# Patient Record
Sex: FEMALE | Race: WHITE | NOT HISPANIC OR LATINO | URBAN - METROPOLITAN AREA
[De-identification: names, ages, dates, MRNs, and addresses within clinical notes are randomized per-mention and may not be internally consistent; named-entity substitution may affect disease eponyms.]

---

## 2017-03-10 ENCOUNTER — OFFICE VISIT - HEALTHEAST (OUTPATIENT)
Dept: FAMILY MEDICINE | Facility: CLINIC | Age: 27
End: 2017-03-10

## 2017-03-10 DIAGNOSIS — Z01.419 WELL WOMAN EXAM: ICD-10-CM

## 2017-03-10 DIAGNOSIS — Z00.00 HEALTHCARE MAINTENANCE: ICD-10-CM

## 2017-03-10 DIAGNOSIS — M25.532 LEFT WRIST PAIN: ICD-10-CM

## 2017-03-10 DIAGNOSIS — L70.9 ACNE: ICD-10-CM

## 2017-03-10 DIAGNOSIS — N89.8 VAGINAL DISCHARGE: ICD-10-CM

## 2017-03-10 ASSESSMENT — MIFFLIN-ST. JEOR: SCORE: 1542.46

## 2017-03-17 LAB
BKR LAB AP ABNORMAL BLEEDING: NO
BKR LAB AP BIRTH CONTROL/HORMONES: ABNORMAL
BKR LAB AP CERVICAL APPEARANCE: ABNORMAL
BKR LAB AP GYN ADEQUACY: ABNORMAL
BKR LAB AP GYN INTERPRETATION: ABNORMAL
BKR LAB AP GYN OTHER FINDINGS: ABNORMAL
BKR LAB AP HPV REFLEX: ABNORMAL
BKR LAB AP LMP: ABNORMAL
BKR LAB AP PATIENT STATUS: ABNORMAL
BKR LAB AP PREVIOUS ABNORMAL: ABNORMAL
BKR LAB AP PREVIOUS NORMAL: ABNORMAL
HIGH RISK?: NO
PATH REPORT.COMMENTS IMP SPEC: ABNORMAL
RESULT FLAG (HE HISTORICAL CONVERSION): ABNORMAL

## 2017-03-30 ENCOUNTER — COMMUNICATION - HEALTHEAST (OUTPATIENT)
Dept: FAMILY MEDICINE | Facility: CLINIC | Age: 27
End: 2017-03-30

## 2017-05-01 ENCOUNTER — AMBULATORY - HEALTHEAST (OUTPATIENT)
Dept: FAMILY MEDICINE | Facility: CLINIC | Age: 27
End: 2017-05-01

## 2017-05-01 DIAGNOSIS — R87.613 HSIL ON PAP SMEAR OF CERVIX: ICD-10-CM

## 2017-05-01 DIAGNOSIS — B37.9 CANDIDA INFECTION: ICD-10-CM

## 2017-05-01 DIAGNOSIS — L70.9 ACNE: ICD-10-CM

## 2017-05-04 ENCOUNTER — COMMUNICATION - HEALTHEAST (OUTPATIENT)
Dept: FAMILY MEDICINE | Facility: CLINIC | Age: 27
End: 2017-05-04

## 2017-05-05 LAB
LAB AP CHARGES (HE HISTORICAL CONVERSION): NORMAL
PATH REPORT.COMMENTS IMP SPEC: NORMAL
PATH REPORT.COMMENTS IMP SPEC: NORMAL
PATH REPORT.FINAL DX SPEC: NORMAL
PATH REPORT.GROSS SPEC: NORMAL
PATH REPORT.MICROSCOPIC SPEC OTHER STN: NORMAL
PATH REPORT.RELEVANT HX SPEC: NORMAL
RESULT FLAG (HE HISTORICAL CONVERSION): NORMAL

## 2017-05-16 ENCOUNTER — OFFICE VISIT - HEALTHEAST (OUTPATIENT)
Dept: FAMILY MEDICINE | Facility: CLINIC | Age: 27
End: 2017-05-16

## 2017-05-16 DIAGNOSIS — Z30.432 ENCOUNTER FOR IUD REMOVAL: ICD-10-CM

## 2017-05-16 ASSESSMENT — MIFFLIN-ST. JEOR: SCORE: 1524.77

## 2017-05-17 ENCOUNTER — AMBULATORY - HEALTHEAST (OUTPATIENT)
Dept: FAMILY MEDICINE | Facility: CLINIC | Age: 27
End: 2017-05-17

## 2017-05-17 DIAGNOSIS — N87.1 CIN II (CERVICAL INTRAEPITHELIAL NEOPLASIA II): ICD-10-CM

## 2017-06-26 ENCOUNTER — COMMUNICATION - HEALTHEAST (OUTPATIENT)
Dept: FAMILY MEDICINE | Facility: CLINIC | Age: 27
End: 2017-06-26

## 2017-07-07 ENCOUNTER — COMMUNICATION - HEALTHEAST (OUTPATIENT)
Dept: ADMINISTRATIVE | Facility: CLINIC | Age: 27
End: 2017-07-07

## 2017-08-14 ENCOUNTER — COMMUNICATION - HEALTHEAST (OUTPATIENT)
Dept: OBGYN | Facility: CLINIC | Age: 27
End: 2017-08-14

## 2017-08-17 ENCOUNTER — OFFICE VISIT - HEALTHEAST (OUTPATIENT)
Dept: FAMILY MEDICINE | Facility: CLINIC | Age: 27
End: 2017-08-17

## 2017-08-17 DIAGNOSIS — M67.431 GANGLION CYST OF WRIST, RIGHT: ICD-10-CM

## 2017-08-17 DIAGNOSIS — Z71.84 TRAVEL ADVICE ENCOUNTER: ICD-10-CM

## 2017-08-17 DIAGNOSIS — R87.613 HSIL ON PAP SMEAR OF CERVIX: ICD-10-CM

## 2017-08-21 ENCOUNTER — COMMUNICATION - HEALTHEAST (OUTPATIENT)
Dept: FAMILY MEDICINE | Facility: CLINIC | Age: 27
End: 2017-08-21

## 2017-08-21 DIAGNOSIS — M67.40 GANGLION CYST: ICD-10-CM

## 2017-09-01 ENCOUNTER — RECORDS - HEALTHEAST (OUTPATIENT)
Dept: ADMINISTRATIVE | Facility: OTHER | Age: 27
End: 2017-09-01

## 2017-09-01 ENCOUNTER — RECORDS - HEALTHEAST (OUTPATIENT)
Dept: VASCULAR ULTRASOUND | Facility: CLINIC | Age: 27
End: 2017-09-01

## 2017-09-01 DIAGNOSIS — M67.40 GANGLION, UNSPECIFIED SITE: ICD-10-CM

## 2017-09-03 ENCOUNTER — AMBULATORY - HEALTHEAST (OUTPATIENT)
Dept: FAMILY MEDICINE | Facility: CLINIC | Age: 27
End: 2017-09-03

## 2017-09-03 DIAGNOSIS — M25.539 WRIST PAIN: ICD-10-CM

## 2017-09-03 DIAGNOSIS — M67.439 GANGLION CYST OF WRIST: ICD-10-CM

## 2017-09-13 ENCOUNTER — COMMUNICATION - HEALTHEAST (OUTPATIENT)
Dept: FAMILY MEDICINE | Facility: CLINIC | Age: 27
End: 2017-09-13

## 2017-09-13 DIAGNOSIS — N92.0 MENORRHAGIA: ICD-10-CM

## 2017-10-26 ENCOUNTER — COMMUNICATION - HEALTHEAST (OUTPATIENT)
Dept: ADMINISTRATIVE | Facility: CLINIC | Age: 27
End: 2017-10-26

## 2017-10-30 ENCOUNTER — RECORDS - HEALTHEAST (OUTPATIENT)
Dept: ADMINISTRATIVE | Facility: OTHER | Age: 27
End: 2017-10-30

## 2017-11-01 ENCOUNTER — AMBULATORY - HEALTHEAST (OUTPATIENT)
Dept: OBGYN | Facility: CLINIC | Age: 27
End: 2017-11-01

## 2017-11-01 ENCOUNTER — COMMUNICATION - HEALTHEAST (OUTPATIENT)
Dept: LAB | Facility: CLINIC | Age: 27
End: 2017-11-01

## 2017-11-01 DIAGNOSIS — Z01.812 PRE-PROCEDURAL LABORATORY EXAMINATION: ICD-10-CM

## 2017-11-06 ENCOUNTER — AMBULATORY - HEALTHEAST (OUTPATIENT)
Dept: OBGYN | Facility: CLINIC | Age: 27
End: 2017-11-06

## 2017-11-06 ENCOUNTER — AMBULATORY - HEALTHEAST (OUTPATIENT)
Dept: LAB | Facility: CLINIC | Age: 27
End: 2017-11-06

## 2017-11-06 DIAGNOSIS — Z01.812 PRE-PROCEDURAL LABORATORY EXAMINATION: ICD-10-CM

## 2017-11-06 DIAGNOSIS — N87.1 MODERATE CERVICAL DYSPLASIA, HISTOLOGICALLY CONFIRMED: ICD-10-CM

## 2017-11-06 RX ORDER — ISOTRETINOIN 20 MG/1
20 CAPSULE, LIQUID FILLED ORAL 3 TIMES DAILY
Status: SHIPPED | COMMUNITY
Start: 2017-11-02

## 2017-11-06 ASSESSMENT — MIFFLIN-ST. JEOR: SCORE: 1533.85

## 2017-11-08 ENCOUNTER — HOSPITAL ENCOUNTER (OUTPATIENT)
Dept: ULTRASOUND IMAGING | Facility: HOSPITAL | Age: 27
Discharge: HOME | End: 2017-11-08
Attending: FAMILY MEDICINE

## 2017-11-08 DIAGNOSIS — N92.0 MENORRHAGIA: ICD-10-CM

## 2017-11-08 LAB
BKR LAB AP ABNORMAL BLEEDING: YES
BKR LAB AP BIRTH CONTROL/HORMONES: ABNORMAL
BKR LAB AP CERVICAL APPEARANCE: ABNORMAL
BKR LAB AP GYN ADEQUACY: ABNORMAL
BKR LAB AP GYN INTERPRETATION: ABNORMAL
BKR LAB AP GYN OTHER FINDINGS: ABNORMAL
BKR LAB AP HPV REFLEX: ABNORMAL
BKR LAB AP LMP: ABNORMAL
BKR LAB AP PATIENT STATUS: ABNORMAL
BKR LAB AP PREVIOUS ABNORMAL: ABNORMAL
BKR LAB AP PREVIOUS NORMAL: 2013
HIGH RISK?: YES
LAB AP CHARGES (HE HISTORICAL CONVERSION): NORMAL
PATH REPORT.COMMENTS IMP SPEC: ABNORMAL
PATH REPORT.COMMENTS IMP SPEC: NORMAL
PATH REPORT.COMMENTS IMP SPEC: NORMAL
PATH REPORT.FINAL DX SPEC: NORMAL
PATH REPORT.GROSS SPEC: NORMAL
PATH REPORT.MICROSCOPIC SPEC OTHER STN: NORMAL
PATH REPORT.RELEVANT HX SPEC: NORMAL
RESULT FLAG (HE HISTORICAL CONVERSION): ABNORMAL
RESULT FLAG (HE HISTORICAL CONVERSION): NORMAL

## 2017-11-10 ENCOUNTER — OFFICE VISIT - HEALTHEAST (OUTPATIENT)
Dept: FAMILY MEDICINE | Facility: CLINIC | Age: 27
End: 2017-11-10

## 2017-11-10 DIAGNOSIS — Z97.5 IUD (INTRAUTERINE DEVICE) IN PLACE: ICD-10-CM

## 2017-11-10 DIAGNOSIS — N87.1 MODERATE CERVICAL DYSPLASIA, HISTOLOGICALLY CONFIRMED: ICD-10-CM

## 2017-11-10 DIAGNOSIS — N92.0 MENORRHAGIA: ICD-10-CM

## 2017-11-10 DIAGNOSIS — R87.613 HSIL ON PAP SMEAR OF CERVIX: ICD-10-CM

## 2017-11-10 DIAGNOSIS — L70.9 ACNE: ICD-10-CM

## 2017-11-10 DIAGNOSIS — Z30.430 ENCOUNTER FOR IUD INSERTION: ICD-10-CM

## 2017-11-10 LAB
HPV INTERPRETATION - HISTORICAL: NORMAL
HPV INTERPRETER - HISTORICAL: NORMAL

## 2017-11-15 ENCOUNTER — COMMUNICATION - HEALTHEAST (OUTPATIENT)
Dept: OBGYN | Facility: CLINIC | Age: 27
End: 2017-11-15

## 2017-11-15 ENCOUNTER — COMMUNICATION - HEALTHEAST (OUTPATIENT)
Dept: FAMILY MEDICINE | Facility: CLINIC | Age: 27
End: 2017-11-15

## 2017-11-28 ENCOUNTER — OFFICE VISIT - HEALTHEAST (OUTPATIENT)
Dept: OBGYN | Facility: CLINIC | Age: 27
End: 2017-11-28

## 2017-11-28 DIAGNOSIS — N87.0 CIN I (CERVICAL INTRAEPITHELIAL NEOPLASIA I): ICD-10-CM

## 2017-11-28 DIAGNOSIS — Z87.410 HISTORY OF CERVICAL DYSPLASIA: ICD-10-CM

## 2017-11-28 ASSESSMENT — MIFFLIN-ST. JEOR: SCORE: 1529.31

## 2017-11-30 ENCOUNTER — RECORDS - HEALTHEAST (OUTPATIENT)
Dept: ADMINISTRATIVE | Facility: OTHER | Age: 27
End: 2017-11-30

## 2021-05-30 VITALS — HEIGHT: 69 IN | BODY MASS INDEX: 24.14 KG/M2 | WEIGHT: 163 LBS

## 2021-05-30 VITALS — BODY MASS INDEX: 24.72 KG/M2 | WEIGHT: 166.9 LBS | HEIGHT: 69 IN

## 2021-05-30 VITALS — BODY MASS INDEX: 24.54 KG/M2 | WEIGHT: 165 LBS

## 2021-05-31 VITALS — BODY MASS INDEX: 24.44 KG/M2 | HEIGHT: 69 IN | WEIGHT: 165 LBS

## 2021-05-31 VITALS — BODY MASS INDEX: 24.84 KG/M2 | WEIGHT: 167 LBS

## 2021-05-31 VITALS — BODY MASS INDEX: 24.25 KG/M2 | WEIGHT: 163 LBS

## 2021-05-31 VITALS — WEIGHT: 164 LBS | HEIGHT: 69 IN | BODY MASS INDEX: 24.29 KG/M2

## 2021-06-09 NOTE — PROGRESS NOTES
FEMALE ADULT PREVENTIVE EXAM    CHIEF COMPLAINT:  Female preventive exam.    SUBJECTIVE:  Mariajose Villanueva is a 26 y.o. female.  She last saw me Visit date not found.  Patient here for annual physical.  She has no major concerns or changes to her health history except she is taking medication for acne and is seeing a dermatologist.  She does want a refill on her Differin gel.  She is on oral medication as well and is unsure of the name but sounds to be Accutane.  She is taking it for a couple months already and give herself a break and recently came back on it.  She has not had any blood draws for this medication.  She does have a question about nevi that is hallowed beneath her left breast as well as one on her back.  She has not shown these yet to the dermatologist.  She also notes that she has been having some mild left wrist pain that has been somewhat painful.  It is not present all the time.  No associated tingling or weakness.  She does do yoga and cycling 2 and 3 days a week and finds that it exacerbates symptoms slightly.  Does not cause any cramping in all major concern but was curious if there was anything she should be concerned about.    Has also been having some mild discomfort of her left inner ear and has had a history of cerumen impaction and would like that looked at.  Also complains of some mild vaginal discharge.  She has had STD testing in the past and no previous history.  She is interested in having that tested again.  She also notes that she has had some bloating in the lower abdomen and some weight gain.  She does exercise regularly and is still within a normal BMI however is unsure if her eating has changed slightly.  She just recently got back into exercising regularly.  No family history of ovarian cancer or other cancers to her knowledge.  She denies any constipation.  No sharp pain and occasionally will have some discomfort in the pelvic region.  She  has no past medical history on  file.    Lab Results   Component Value Date     03/10/2017    K 3.9 03/10/2017    BUN 12 03/10/2017     No results found for: CHOL, HDL, LDLCALC, TRIG  No results found for: TSH  BP Readings from Last 3 Encounters:   03/10/17 108/72   06/06/16 98/58   04/21/16 100/64       Surgeries:    Past Surgical History:   Procedure Laterality Date     NO PAST SURGERIES         Family History:  Her family history includes Coronary artery disease in her paternal uncle.  Family history of stroke.  Social History:  She  reports that she has never smoked. She does not have any smokeless tobacco history on file. She reports that she drinks alcohol.    Medications:    Current Outpatient Prescriptions:      adapalene (DIFFERIN) 0.1 % gel, Apply topically bedtime., Disp: 45 g, Rfl: 3     clindamycin (CLINDAGEL) 1 % gel, Apply topically 2 (two) times a day., Disp: , Rfl:   HELD MEDICATIONS: None.    Allergies:  No latex allergies.  No Known Allergies    RISK BEHAVIOR & HEALTH HABITS  Patient exercises 4 days a week and eats 3 healthy meals a day.  Drinks maybe 1 alcoholic beverage per week.  She works as a research assistant and a single but does have sexual partner that is new.    Guns: NO  Sun Screen: YES  Dental Care: YES    REVIEW OF SYSTEMS:  Complete head to toe review of systems is otherwise negative except as above.    OBJECTIVE:  VITAL SIGNS:    Vitals:    03/10/17 1257   BP: 108/72   Pulse: 60   Resp: 16   Temp: 98  F (36.7  C)     GENERAL:  Patient alert, in no acute distress.  EYES: PERRLA. Extraoccular movements intact, pupils equal, reactive to light and accommodation.  Normal conjunctiva and lids.  Undilated fudoscopic exam normal, including normal size, appearance cup-to-disc ratio.  Normal posterior segments, including no exudates or hemorrhages.  ENT:  Hearing grossly normal.  Normal appearance to ears and nose.  Bilateral TM s, external canals, oropharynx normal. Normal lips, gums and teeth.  Normal nasal  mucosa, septum and turbinates.  NECK:  Supple, without thyromegaly or mass.  RESP:  Clear to auscultation without crackles, wheezes or distress.  Normal respiratory effort.   CV:  Regular rate and rhythm without murmurs, rubs or gallops.  Normal carotid, abdominal aorta, femoral and pedal pulses.  No varicosities or edema.  ABDOMEN:  Soft, non-tender, without hepatosplenomegaly, masses, or hernias.   BREASTS:  Nontender, without masses, nipple discharge, erythema, or axillary adenopathy.    :  Normal pelvic exam, including external genitalia with normal appearance and no lesions.  Normal vaginal exam, including no discharge and normal pelvic support, and no lesions.   Normal ureters, with no masses, tenerness or scarring.  Normal bladder, with no fullness, masses or tenderness.  Cervix well visualized, with normal appearance and no lesions .  Normal uterus, including normal size, shape, mobility with no tenderness.  Normal adnexa, including no nodules, masses or tenderness.  Minimal vaginal discharge  LYMPHATIC: Normal palpation of neck, groin and axilla..  No lymphadenopathy.  No bruising.  NEURO:  CN II-XII intact, motor & sensory function all intact.  DTR and reflexes normal.  PSYCHIATRIC:  Alert & oriented with normal mood and affect.  Good judgment and insight.  SKIN:  Normal inspection and palpation.  Cystic acne prominent more so on lower chin.  She does have hallowed nevi beneath left lower breast-the patient is new..  Also lesion on her right ankle that she has been keeping an eye on and one on her back neither of which look concerning.  MUSCULOSKELETAL: Normal gait and station.  - Spine / Ribs / Pelvis: Normal inspection, ROM, stability and strength: Spine, Head, Neck, Upper and Lower Extremities.    ASSESSMENT & PLAN  Mariajose was seen today for annual exam.    Diagnoses and all orders for this visit:    Well woman exam-we did discuss her abdominal bloating quite a bit and according the patient does not  seem to be consistent with constipation.  We are going to monitor her weight gain now that she is back exercising.  No palpable mass on exam and we did discuss very vague symptoms of ovarian cancer.  I do not think that she has high risk.  We did discuss that if she was ever interested we could consider pelvic ultrasound.  -     Chlamydia trachomatis & Neisseria gonorrhoeae, Amplified Detection  -White prep also ordered for vaginal discharge.  -IUD in place.  -Follow-up annually.  -Patient declines any other STD testing for vaginal discharge.  -     Gynecologic Cytology (PAP Smear)  -     adapalene (DIFFERIN) 0.1 % gel; Apply topically bedtime.    Acne-supposedly on Accutane as it appears on history.  Recommended having liver assessment given potential toxicity with this medication.  She should also inquire with her dermatologist when she sees them next about the nevi on her left breast so we can avoid biopsy if possible  -     Comprehensive Metabolic Panel    Left wrist pain no tendinopathy or weakness on exam.  Patient not overly concerned and does not feel any need to evaluate further at this time.  She is going to try to modify behaviors first and will follow-up if needed    Vaginal discharge- see above      Other orders  -     Cancel: Influenza, Seasonal Quad, Preservative Free 36+ Months  -     Cancel: Lipid Profile      General  Immunizations reviewed and updated .  Alcohol use, safety and moderation discussed.  Recommended adequate calcium intake/osteoporosis prevention.  Discussed colon cancer screening at age 50, 45 if -American.  Diet and exercise reviewed, including goal of aerobic exercise 30-90 minutes most days of the week, moderation of portions sizes, avoiding eating out and fast food and increase in fruits and vegetables.  Discussed safe sex practices.  Discussed & recommended seat belt (& motorcycle helmet) use.  Discussed & recommended smoke detector.  Discussed sun protection.  Discussed  weight management.

## 2021-06-10 NOTE — PROGRESS NOTES
Colposcopy Procedure Note    Mariajose Villanueva is a 26 y.o. female is here today for a Colposcopy. She took ibuprofen prior to her procedure. She has never had a colposcopy or abnormal Pap smear prior to her last Pap smear on . She has never been a smoker. She denies any known family history of cervical cancer. She had three doses of the HPV quadrivalent vaccine. She had a Pau IUD placed in 2015; she does not have any menstrual periods with her IUD in place. She was 20 when she first became sexually active. She is currently sexually active; she denies any known history of STD's.     Indications: Pap smear on 3/10/2017 showed: ASC cannot exclude high grade lesion (ASCH) and HPV was not tested. The prior pap showed ASCUS with NEGATIVE high risk HPV on 4/21/2016. Prior cervical/vaginal disease: normal exam without visible pathology. Prior cervical treatment: no treatment.    Procedure Details   The reason for procedure is explained and informed consent obtained.    Speculum placed in vagina and visualization of cervix achieved, cervix swabbed with 3% acetic acid solution. Cervix is also swabbed with Lugol's solution. Cervical biopsy taken at 6 o'clock. Endocervical curettage performed. Cervical biopsy taken at 12 o'clock. Hemostasis achieved with Monsel's solution.    Findings:  Cervix: acetowhite lesion(s) noted at 12 o'clock and abnormal vessels noted at 6 o'clock; colposcopy encompassing CIN1-II.       Specimens: See orders.  Biopsy at 6 o'clock, large vessel, placed in formalin at 2:50 pm   ECC biopsy placed in formalin at 2:52 pm  Biopsy at 12 o'clock, inflammatory area, placed in formalin at 2:54 pm    Complications: none.    Plan:  Specimens labelled and sent to Pathology. Role of HPV in causing cervical pap smear abnormalities, dysplasia, and cancer is reviewed with the patient. She will be contacted in a few days with biopsy results and recommendations.  I am unsure why HPV did not reflux in this patient.  I will  talk to the lab about that.  Otherwise if biopsies come back unremarkable she should follow-up in 12 months and 24 months for repeat Pap smear code testing versus referring for LEEP.  We also discussed given the fungal organisms and her IUD that inflammation could potentially cause abnormal cell changes on Pap smear.  Will treat for the fungal infection with fluconazole.  She is not having symptoms.  Previous HPV testing was negative.    -Blood pressure slightly low after recently starting spironolactone with her dermatologist.  She will be following up there with blood work.  She does not have any dizzy symptoms.    The procedure lasted a total of 30 minutes face to face with the patient.     I, Tali Priest, am scribing for and in the presence of Dr. Mckeon.  I, Dr. Mckeon, personally performed the services described in this documentation as scribed by Tali Priest in my presence, and it is both accurate and complete.

## 2021-06-10 NOTE — PROGRESS NOTES
"SUBJECTIVE:   Chief Complaint   Patient presents with     IUD removal     placed 05/08/2015 at [facility] - will have removed due to it causing acne     Mariajose Villanueva 26 y.o. female    Current Outpatient Prescriptions   Medication Sig Dispense Refill     adapalene (DIFFERIN) 0.1 % gel Apply topically bedtime. 45 g 3     clindamycin (CLINDAGEL) 1 % gel Apply topically 2 (two) times a day.       norgestimate-ethinyl estradiol (ORTHO TRI-CYCLEN, 28,) 0.18/0.215/0.25 mg-35 mcg (28) Tab tablet Take 1 tablet by mouth daily. 84 tablet 4     spironolactone (ALDACTONE) 50 MG tablet Take 50 mg by mouth 2 (two) times a day at 9am and 6pm.       No current facility-administered medications for this visit.      Allergies: Review of patient's allergies indicates no known allergies.   No LMP recorded. Patient is not currently having periods (Reason: Contraception).    HPI:   Pleasant 26-year-old here for IUD removal.  Had Pau IUD placed about 2 years ago.  Wants it removed because of cystic acne.  Changing to oral contraceptives.  She has already discussed this plan with her PCP Dr. Mckeon, and she has a prescription for oral contraceptives to start after IUD removal.    ROS: as per HPI    OBJECTIVE:   BP 96/66  Pulse (!) 48  Ht 5' 8.75\" (1.746 m)  Wt 163 lb (73.9 kg)  Breastfeeding? No  BMI 24.25 kg/m2    General: Pleasant, well-appearing, in no acute distress.  Pelvis: Normal external genitalia.  Speculum exam reveals normal vaginal mucosa, normal cervix.  IUD strings seen exiting the cervical.  Strings were grasped with a sterile ringed forceps and IUD was easily removed with gentle traction.  No bleeding.  Patient tolerated it well.      ASSESSMENT/PLAN  1.  IUD removal  Pau IUD removed easily without complication today.  She will start oral contraceptive and use backup birth control as previously directed by her PCP.  "

## 2021-06-12 NOTE — PROGRESS NOTES
ASSESSMENT & PLAN:  Mariajose was seen today for travel consult.    Diagnoses and all orders for this visit:    Travel advice encounter    Ganglion cyst of wrist, right    HSIL on Pap smear of cervix    Other orders  -     azithromycin (ZITHROMAX) 500 MG tablet; Take 2 tablets by mouth once for travelers diarrhea  -     typhoid (VIVOTIF) SR capsule; Take 1 capsule every other day for 4 doses  -     doxycycline (VIBRA-TABS) 100 MG tablet; Take 1 tab by mouth daily starting 2 days before travel and continue for 4 weeks after return  -     Hepatitis A vaccine adult IM      Patient will be traveling to [facility] and reviewed with her CDC recommendations for immunizations.  She has opted to do oral typhoid and we discussed that it needs to be refrigerated and also completed before she starts her doxycycline for appropriate effectiveness.  She is also due for her hepatitis a immunization.  We discussed mosquito nuts and eat to prevent mosquito vector illnesses.  If any fever within 6 months of return she should present to clinic for further evaluation.  We discussed malaria prophylaxis and she is opted to doing the doxycycline although she does have very fair skin she will need to use appropriate sunscreen.  We opted for this medication due to her history of acne and also to prevent GI illness it may be the best solution.  I did give her a prescription for azithromycin in the event that she needs treatment for cholera or traveler's diarrhea.  I did not recommend the cholera immunization and she did not need yellow fever.  -Her birth control was renewed and we discussed again when she is going to need to follow-up for another Pap smear.  -Regarding the cyst in her hand it is fairly deep and I do not feel that it is something that I would likely be able to aspirate.  We discussed that we could consider x-ray imaging or ultrasound to evaluate it further but that I may just recommend that she see the hand ortho for further  evaluation.  She will let me know what she would like to do for follow-up    There are no Patient Instructions on file for this visit.     Orders Placed This Encounter   Procedures     Hepatitis A vaccine adult IM     Medications Discontinued During This Encounter   Medication Reason     spironolactone (ALDACTONE) 50 MG tablet Therapy completed       No Follow-up on file.     CHIEF COMPLAINT:  Chief Complaint   Patient presents with     Travel Consult     Going to [facility] for 17 days.  Leaves on 9-2-17        HISTORY OF PRESENT ILLNESS:  Mariajose is a 26 y.o. female presenting to the clinic today for a travel consultation and for evaluation of her right hand pain.     Travel: She is leaving for [facility] on September 2, 2017 and she is staying there for 17 days. She is going to visit her friend and do a safari; her friend lives in [facility] with her boyfriend. She has been to [place] and [place] in the past; she was in [place] in March 2017 and she did not have any typhoid vaccinations at that time. She did not become sick in [facility]. She would like to do the oral typhoid vaccine. She will not be doing any mono work, though her friend works in that field. She does not plan to do any white water rafting or climb [facility]. She would like to take doxycycline for malaria prevention. She is wondering if there are any other interactions she should watch for with these new prescriptions.     Right Hand Pain: She was doing yoga 2-3 weeks ago and the middle of her right palm felt like it popped. She has not been able to put pressure on her hand because it feels weird; putting pressure on the arm of chairs to stand up is painful. She has been resting for the past two weeks. She is always on her computer which aggravates her right palm. Her pain has been better since she has been resting. She is wondering if she could take ibuprofen.     REVIEW OF SYSTEMS:   Contraception: She had her Pau removed about 3 months ago. She has  been tolerating Trinessa 0.18/0.215/0.25 mg-35 mcg daily for birth control. She is not planning to become pregnant anytime soon.     Acne: She is aware she should be careful about sun exposure while using clindamycin and adapalene. She might start Accutane when she returns from [facility]. She might have been on doxycycline in the past for acne.     All other systems are negative.     PFSH:    TOBACCO USE:  History   Smoking Status     Never Smoker   Smokeless Tobacco     Not on file        VITALS:  Vitals:    08/17/17 1433   BP: 108/66   Pulse: 60   Resp: 16   Temp: 98.1  F (36.7  C)   TempSrc: Oral   Weight: 167 lb (75.8 kg)     Wt Readings from Last 3 Encounters:   08/17/17 167 lb (75.8 kg)   05/16/17 163 lb (73.9 kg)   05/01/17 165 lb (74.8 kg)     Body mass index is 24.84 kg/(m^2).  Patient's last menstrual period was 07/17/2017.     PHYSICAL EXAM:  GENERAL:  Reveals an alert 26 y.o. female in NAD.  Vitals:  Per nursing notes.  EYES: PERRLA. Extraocular movements intact. Normal conjunctiva and lids.   ENT:  Hearing grossly normal.  Normal appearance to ears and nose.  Bilateral TM s, external canals, oropharynx normal. Normal lips, gums and teeth.  Normal nasal mucosa, septum and turbinates.  NECK:  Supple, thyroid not palpable.  CARDIAC:  Regular rate and rhythm without murmurs, rubs, or gallops. Legs without edema. Carotids without bruits.   LUNGS: Clear.  Respiratory effort normal.  ABDOMEN:  Soft, non-tender, without hepatosplenomegaly, masses, or hernias.   SKIN:   Without rash, bruise, or palpable lesions.  NEURO:  Cranial nerves II-XII intact.     PSYCH:  Mood appropriate, memory intact.   musc: Full range of motion of wrist with 5 out of 5 muscle strength testing.  There is a small nodular lesion at the CMC joint that feels consistent with a deep ganglion cyst.  No color changes or swelling        DATA REVIEWED:  ADDITIONAL HISTORY SUMMARIZED (2): None.   DECISION TO OBTAIN EXTRA INFORMATION (1): None.    RADIOLOGY TESTS (1): None.  LABS (1): Reviewed last Pap smear  MEDICINE TESTS (1): None.  INDEPENDENT REVIEW (2 each): None.     The visit lasted a total of 27 minutes face to face with the patient. Over 50% of the time was spent counseling and educating the patient about hand pain, travel-related illnesses and treatments.     ITali, am scribing for and in the presence of Dr. Mckeon.  IRicha DO , personally performed the services described in this documentation, as scribed by Tali Priest in my presence, and it is both accurate and complete.    This note has been dictated using voice recognition software. Any grammatical or context distortions are unintentional and inherent to the software.     MEDICATIONS:  Current Outpatient Prescriptions   Medication Sig Dispense Refill     adapalene (DIFFERIN) 0.1 % gel Apply topically bedtime. 45 g 3     clindamycin (CLINDAGEL) 1 % gel Apply topically 2 (two) times a day.       TRINESSA, 28, 0.18/0.215/0.25 mg-35 mcg (28) Tab tablet TAKE 1 TABLET BY MOUTH DAILY 84 tablet 3     UNABLE TO FIND Med Name: Has a new medication for acne.  She will be on it for a month, but cannot remember the name.       azithromycin (ZITHROMAX) 500 MG tablet Take 2 tablets by mouth once for travelers diarrhea 2 tablet 0     doxycycline (VIBRA-TABS) 100 MG tablet Take 1 tab by mouth daily starting 2 days before travel and continue for 4 weeks after return 50 tablet 0     typhoid (VIVOTIF) SR capsule Take 1 capsule every other day for 4 doses 4 capsule 0     No current facility-administered medications for this visit.

## 2021-06-13 NOTE — PROGRESS NOTES
Colposcopy Procedure Note    Mariajose Villanueva is a 26 y.o. female is here today for a Colposcopy. Referred by Dr. Mckeon after KARINA II found on colp bx 5/17.     Indications:     Pap Smear/[place] History:  5/17: [place] KARINA I-II, negative ECC  3/17 Pap smear: Ascus-H  4/16 Pap smear: Ascus negative HPV    Other history: OCP, nonsmoker, one sexual partner. Going to study abroad for 3 years in [place] in Jan/2018. Otherwise healthy.     Procedure Details   The reason for procedure is explained and informed consent obtained. UPT negative.     Speculum placed in vagina and visualization of cervix achieved. Pap smear was obtained. Cervix swabbed with 3% acetic acid solution. Procedure details: Endocervical curettage performed, Cervical biopsies taken at 6 o'clock, Specimen labelled and sent to pathology and Hemostasis achieved with silver nitrate    Findings:  Cervix: SCJ visualized - lesion at 6 o'clock c/w KARINA I; some ACEW changes around T zone c/w squamous metaplasia. No discrete lesions seen. No abnormal vasculature changes noted.        Specimens: See orders, ECC and Cervical biopsy at 6 oclock.     Complications: none.    Plan:  Specimens labelled and sent to Pathology. Role of HPV in causing cervical pap smear abnormalities, dysplasia, and cancer is reviewed with the patient. She will be contacted in a few days with biopsy results and recommendations.    Lesly Lopez       Addendum: KARINA I on bx, ECC neg, but scant tissue. Pap smear is ASCUS-H, HPV pending. Discussed this result with patient. Given leaving the country in Jan will make appt with Dr. Vyas to discuss possible LEEP vs follow up.

## 2021-06-14 NOTE — PROGRESS NOTES
"CC: The patient is being seen as a consult from Dr Mckeon secondary to abnormal Pap smears.    HPI: The pt is a 27 y.o. SWF P0 who presents with a history of KARINA II.  Past history is     Date  Pap  HPV  Biopsy   9/5/13  Normal   4/21/16 ASCUS Negative   3/10/17 ASC-H  Not done   5/1/17      6:00 KARINA I and focal KARINA II, 12:00 KARINA I, ECC normal   11/6/17 ASC-H  Negative 6:00 KARINA I, ECC normal with note that it correlates with Pap findings    She is currently with one partner but total has had about 10 partners.  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  She gets free health care through her program.  She will be home in Aug of 2018.    Past Medical History:   Diagnosis Date     Medical history non-contributory        Past Surgical History:   Procedure Laterality Date     COLPOSCOPY W/ BIOPSY / CURETTAGE  11/06/2017    with Dr. Lopez       Patient's   Family History   Problem Relation Age of Onset     Coronary artery disease Paternal Uncle        Patient   Social History     Social History     Marital status: Single     Spouse name: N/A     Number of children: N/A     Years of education: N/A     Social History Main Topics     Smoking status: Never Smoker     Smokeless tobacco: Never Used     Alcohol use Yes      Comment: social      Drug use: None     Sexual activity: Yes     Partners: Male     Birth control/ protection: IUD     Other Topics Concern     None     Social History Narrative       Current Outpatient Prescriptions   Medication Sig Dispense Refill     MYORISAN 20 mg capsule Take 20 mg by mouth 3 (three) times a day.        No current facility-administered medications for this visit.        Patient has No Known Allergies.    ROS:  12 part ROS is negative aside from those symptoms in the HPI    PE:  /68 (Patient Site: Right Arm, Patient Position: Sitting, Cuff Size: Adult Regular)  Pulse 72  Ht 5' 8.75\" (1.746 m)  Wt 164 lb (74.4 kg)  LMP 11/10/2017  SpO2 100%  " BMI 24.4 kg/m2          Body mass index is 24.4 kg/(m^2).    General: WN/WD WF, NAD  Psych: normal mood  Neuro: CN I-XII grossly intact  MS: normal gait    Assessment: 27 y.o. SWF P0 with a history of KARINA II in May, now KARINA I.    Plan: Natural history of cervical dysplasia discussed with the patient.  We discussed exposure with new partners to HPV.  We discussed condom use as the best bet.  She has been vaccinated for HPV.  We discussed that while it's unusual to be HPV negative with KARINA II, it is possible.  We discussed recommendations under 30 and over 30 years old.  We discussed that since her disease process has improved since May, and since the biopsy findings correlate with the Pap smear that was done on the same day, she doesn't need a LEEP at this point.  We discussed keeping her immune system healthy to help promote further resolution.  We discussed that the recommendation is that she have a follow up Pap smear done with HPV as indicated in a year.  She can either do this when she is in [place] next Aug or she can do it in [place] next Nov.  Questions were answered.  She is in agreement with this plan.    Approximately 30 minutes were spent with the patient with the majority in counseling.

## 2021-06-14 NOTE — PROGRESS NOTES
Mariajose was seen today for contraception.    Diagnoses and all orders for this visit:    Encounter for IUD insertion  -     Pregnancy, Urine  -     US Pelvis With Transvaginal Non OB; Future  -     US Pelvis With Transvaginal Non OB    Acne    HSIL on Pap smear of cervix    Moderate cervical dysplasia, histologically confirmed    Menorrhagia  -     Chlamydia trachomatis & Neisseria gonorrhoeae, Amplified Detection    IUD (intrauterine device) in place - Kyleena placed 11/10/2017    -IUD insertion today for menorrhagia.  She just completed the last day of her birth control and we discussed doubling up with condoms for the next 1-2 weeks.  Tolerated Pau in the past except for acne but now is on Accutane and being managed by dermatology.  She is going [place] be [place] the next 3 years starting January.  She recently had  Follow-up colposcopy 2 days ago for HSIL with KARINA lesion biopsied.  Was recommended that she follow-up with the procedure since she is going to be gone for several years in order to be preventative.  She plans to have this scheduled prior to leaving with Dr. Vyas.  Since I had a difficult time with IUD placement today and was not able to measure greater than 5 cm despite pretreating with misoprostol I recommended that patient have an ultrasound scheduled to verify placement.  Reviewing records several years ago and the Yves was placed uterus was measured at 7 cm.  Recent ultrasound did not show a retroverted uterus.  If IUD placement is appropriate then when she has her LEEP procedure they can re-check if strings are present and determine if necessary.  She will follow-up with me on an as-needed basis.  If she needs documentation prior to international travel indicating that she is free of communicable disease will do so.  Urinary GC collected to be thorough given history of irregular bleeding with no clear etiology.  Reviewed that ultrasound did not show any concerning findings.

## 2021-06-14 NOTE — PROGRESS NOTES
Kyleena IUD Insertion Procedure Note    Pre-operative Diagnosis: Menorrhagia, desire for Kyleena IUD contraception    Post-operative Diagnosis: Kyleena IUD in place    Indications: contraception, menorrhagia     Comments: She has had vaginal bleeding almost continuously since she went to [place] in September 2017; she had an amazing time in [place]. she has had a few days without spotting, but then the bleeding would start again. She didn't stop taking her birth control, Trinessa. She had a pelvic ultrasound on 11/8/2017 and it was normal. Her periods were normal for 2 months on Trinessa before she started having menorrhagia. She had a colposcopy with Dr. Lopez on 11/6/2017 and she recommended a LEEP; she said the LEEP would be for prevention, but it was recommended before she leaves the country in case. 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 She had a Pau IUD before placed at [facility], and she had it removed because it caused severe acne; her acne did not resolve after the IUD was removed, so now she is taking Accutane/Myorisan 20 mg once daily for the next three days because she was sick while taking 20 mg three times daily. She was on Claravis before and it didn't work. Her IUD was difficult to place last time. She is sexually active and she has a stable partner; he will not be moving to [place] with her. She did take Cytotec as prescribed before the procedure today. She is no longer taking doxycycline. She sometimes has vaginal dryness with intercourse. She ran out of Trinessa today, but she can use condoms for backup. She denies any abnormal vaginal discharge or dyspareunia. She did take ibuprofen today.     Procedure Details   Urine pregnancy test was done today and result was negative.  The risks (including infection, bleeding, pain, and uterine perforation) and benefits of the procedure were explained to the patient and verbal and written informed consent  was obtained.      The cervix was cleansed with betadine prep. Tenaculum was applied to cervix with tension to straighten cervical canal. The uterus was sounded to 5 cm. The Kyleena insertion apparatus was prepared and introduced into the cervix without significant resistance.  The IUD was placed in the uterus. The strings were trimmed 4.5 cm below the cervix. Patient tolerated procedure well.    Kyleena IUD Information:  Lot #: [id number], Expiration date: 02/19.    Condition:  Stable    Complications:  None    Plan:  Pt was instructed to call if heavy bleeding, severe cramping or foul smelling discharge. May take 400-600 mg ibuprofen TID or prn for mild cramping. Pt should return in one month for IUD string check if desired. Pt instructed she requires a new IUD device in 5 years.    The visit lasted a total of 22 minutes face to face with the patient.   I, Tali Priest, am scribing for and in the presence of Dr. Mckeon.  I, Dr. Richa Mckeon DO , personally performed the services described in this documentation as scribed by Tali Priest in my presence, and it is both accurate and complete.

## 2021-06-15 PROBLEM — R87.613 HSIL ON PAP SMEAR OF CERVIX: Status: ACTIVE | Noted: 2017-05-01

## 2021-06-16 PROBLEM — N87.1 MODERATE CERVICAL DYSPLASIA, HISTOLOGICALLY CONFIRMED: Status: ACTIVE | Noted: 2017-11-06

## 2021-06-16 PROBLEM — N92.0 MENORRHAGIA: Status: ACTIVE | Noted: 2017-11-10

## 2021-06-16 PROBLEM — Z97.5 IUD (INTRAUTERINE DEVICE) IN PLACE: Status: ACTIVE | Noted: 2017-11-10

## 2021-07-03 NOTE — ADDENDUM NOTE
Addendum Note by Toney Almonte DO at 5/12/2017  5:48 AM     Author: Toney Almonte DO Service: -- Author Type: Physician    Filed: 5/12/2017  5:48 AM Encounter Date: 5/4/2017 Status: Signed    : Toney Almonte DO (Physician)    Addended by: TONEY ALMONTE on: 5/12/2017 05:48 AM        Modules accepted: Orders